# Patient Record
Sex: MALE | Race: WHITE | NOT HISPANIC OR LATINO | URBAN - METROPOLITAN AREA
[De-identification: names, ages, dates, MRNs, and addresses within clinical notes are randomized per-mention and may not be internally consistent; named-entity substitution may affect disease eponyms.]

---

## 2020-01-31 ENCOUNTER — EMERGENCY (EMERGENCY)
Facility: HOSPITAL | Age: 30
LOS: 1 days | Discharge: HOME | End: 2020-01-31
Attending: EMERGENCY MEDICINE | Admitting: EMERGENCY MEDICINE
Payer: COMMERCIAL

## 2020-01-31 VITALS
HEART RATE: 106 BPM | WEIGHT: 220.02 LBS | DIASTOLIC BLOOD PRESSURE: 70 MMHG | SYSTOLIC BLOOD PRESSURE: 119 MMHG | OXYGEN SATURATION: 95 % | RESPIRATION RATE: 18 BRPM | HEIGHT: 63 IN | TEMPERATURE: 98 F

## 2020-01-31 VITALS
TEMPERATURE: 99 F | OXYGEN SATURATION: 97 % | HEART RATE: 93 BPM | RESPIRATION RATE: 18 BRPM | DIASTOLIC BLOOD PRESSURE: 71 MMHG | SYSTOLIC BLOOD PRESSURE: 119 MMHG

## 2020-01-31 DIAGNOSIS — R07.89 OTHER CHEST PAIN: ICD-10-CM

## 2020-01-31 LAB
ANION GAP SERPL CALC-SCNC: 10 MMOL/L — SIGNIFICANT CHANGE UP (ref 5–17)
BASOPHILS # BLD AUTO: 0.05 K/UL — SIGNIFICANT CHANGE UP (ref 0–0.2)
BASOPHILS NFR BLD AUTO: 0.7 % — SIGNIFICANT CHANGE UP (ref 0–2)
BUN SERPL-MCNC: 12 MG/DL — SIGNIFICANT CHANGE UP (ref 7–23)
CALCIUM SERPL-MCNC: 9.1 MG/DL — SIGNIFICANT CHANGE UP (ref 8.4–10.5)
CHLORIDE SERPL-SCNC: 99 MMOL/L — SIGNIFICANT CHANGE UP (ref 96–108)
CO2 SERPL-SCNC: 29 MMOL/L — SIGNIFICANT CHANGE UP (ref 22–31)
CREAT SERPL-MCNC: 0.85 MG/DL — SIGNIFICANT CHANGE UP (ref 0.5–1.3)
EOSINOPHIL # BLD AUTO: 0.31 K/UL — SIGNIFICANT CHANGE UP (ref 0–0.5)
EOSINOPHIL NFR BLD AUTO: 4.2 % — SIGNIFICANT CHANGE UP (ref 0–6)
GLUCOSE SERPL-MCNC: 88 MG/DL — SIGNIFICANT CHANGE UP (ref 70–99)
HCT VFR BLD CALC: 42.9 % — SIGNIFICANT CHANGE UP (ref 39–50)
HGB BLD-MCNC: 13.8 G/DL — SIGNIFICANT CHANGE UP (ref 13–17)
IMM GRANULOCYTES NFR BLD AUTO: 0.5 % — SIGNIFICANT CHANGE UP (ref 0–1.5)
LYMPHOCYTES # BLD AUTO: 1.56 K/UL — SIGNIFICANT CHANGE UP (ref 1–3.3)
LYMPHOCYTES # BLD AUTO: 21.2 % — SIGNIFICANT CHANGE UP (ref 13–44)
MAGNESIUM SERPL-MCNC: 2.3 MG/DL — SIGNIFICANT CHANGE UP (ref 1.6–2.6)
MCHC RBC-ENTMCNC: 29.5 PG — SIGNIFICANT CHANGE UP (ref 27–34)
MCHC RBC-ENTMCNC: 32.2 GM/DL — SIGNIFICANT CHANGE UP (ref 32–36)
MCV RBC AUTO: 91.7 FL — SIGNIFICANT CHANGE UP (ref 80–100)
MONOCYTES # BLD AUTO: 1.02 K/UL — HIGH (ref 0–0.9)
MONOCYTES NFR BLD AUTO: 13.8 % — SIGNIFICANT CHANGE UP (ref 2–14)
NEUTROPHILS # BLD AUTO: 4.39 K/UL — SIGNIFICANT CHANGE UP (ref 1.8–7.4)
NEUTROPHILS NFR BLD AUTO: 59.6 % — SIGNIFICANT CHANGE UP (ref 43–77)
NRBC # BLD: 0 /100 WBCS — SIGNIFICANT CHANGE UP (ref 0–0)
PLATELET # BLD AUTO: 346 K/UL — SIGNIFICANT CHANGE UP (ref 150–400)
POTASSIUM SERPL-MCNC: 4.4 MMOL/L — SIGNIFICANT CHANGE UP (ref 3.5–5.3)
POTASSIUM SERPL-SCNC: 4.4 MMOL/L — SIGNIFICANT CHANGE UP (ref 3.5–5.3)
RBC # BLD: 4.68 M/UL — SIGNIFICANT CHANGE UP (ref 4.2–5.8)
RBC # FLD: 13.8 % — SIGNIFICANT CHANGE UP (ref 10.3–14.5)
SODIUM SERPL-SCNC: 138 MMOL/L — SIGNIFICANT CHANGE UP (ref 135–145)
WBC # BLD: 7.37 K/UL — SIGNIFICANT CHANGE UP (ref 3.8–10.5)
WBC # FLD AUTO: 7.37 K/UL — SIGNIFICANT CHANGE UP (ref 3.8–10.5)

## 2020-01-31 PROCEDURE — 83735 ASSAY OF MAGNESIUM: CPT

## 2020-01-31 PROCEDURE — 85025 COMPLETE CBC W/AUTO DIFF WBC: CPT

## 2020-01-31 PROCEDURE — 36415 COLL VENOUS BLD VENIPUNCTURE: CPT

## 2020-01-31 PROCEDURE — 99284 EMERGENCY DEPT VISIT MOD MDM: CPT

## 2020-01-31 PROCEDURE — 93005 ELECTROCARDIOGRAM TRACING: CPT

## 2020-01-31 PROCEDURE — 96374 THER/PROPH/DIAG INJ IV PUSH: CPT

## 2020-01-31 PROCEDURE — 71046 X-RAY EXAM CHEST 2 VIEWS: CPT

## 2020-01-31 PROCEDURE — 71046 X-RAY EXAM CHEST 2 VIEWS: CPT | Mod: 26

## 2020-01-31 PROCEDURE — 93010 ELECTROCARDIOGRAM REPORT: CPT | Mod: NC

## 2020-01-31 PROCEDURE — 80048 BASIC METABOLIC PNL TOTAL CA: CPT

## 2020-01-31 PROCEDURE — 84484 ASSAY OF TROPONIN QUANT: CPT

## 2020-01-31 PROCEDURE — 99284 EMERGENCY DEPT VISIT MOD MDM: CPT | Mod: 25

## 2020-01-31 RX ORDER — KETOROLAC TROMETHAMINE 30 MG/ML
15 SYRINGE (ML) INJECTION ONCE
Refills: 0 | Status: DISCONTINUED | OUTPATIENT
Start: 2020-01-31 | End: 2020-01-31

## 2020-01-31 RX ADMIN — Medication 15 MILLIGRAM(S): at 15:51

## 2020-01-31 RX ADMIN — Medication 15 MILLIGRAM(S): at 14:38

## 2020-01-31 NOTE — ED PROVIDER NOTE - CLINICAL SUMMARY MEDICAL DECISION MAKING FREE TEXT BOX
Patient in ED w concern for back/chest wall pain intermittently for many months.  No prior eval for symptoms.  CXR, labs and EKG completed.  EKG with non specific ST changes.  Labs and CXR unremarkable.  Patient is feeling well in ED and aware of all results.  He is encouraged to follow up with a primary care physician as well as cardiologist in 1-2 days for re eval.  Patient is instructed to take tylenol/motrin OTC as needed for pain and to return to ED immediately should his symptoms worsen or if he has any concern prior to this recommended follow up.  Patient is aware of plan and verbalizes his understanding.

## 2020-01-31 NOTE — ED ADULT NURSE NOTE - NSFALLRSKASSESSDT_ED_ALL_ED
Panel Management Review      Patient has the following on her problem list: None      Composite cancer screening  Chart review shows that this patient is due/due soon for the following Mammogram  Summary:    Patient is due/failing the following:   MAMMOGRAM    Action needed:   None. Mammogram completed.    Type of outreach:    Patient informed clinic that mammogram was completed.    Questions for provider review:    None                                                                                                                                    Lynne Apodaca CMA       Chart routed to N/A .           31-Jan-2020 14:19

## 2020-01-31 NOTE — ED PROVIDER NOTE - DIAGNOSTIC INTERPRETATION
Attending: Marie Mcduffie   CXR wet read: The heart appears to be within normal limits in transverse diameter.  No acute infiltrates, effusions or evidence of pulmonary vascular congestion.  The chest wall and surrounding bony structures appear normal.

## 2020-01-31 NOTE — ED PROVIDER NOTE - NSFOLLOWUPCLINICS_GEN_ALL_ED_FT
[facility]  Family Medicine  [facility]. [address]  [place], [place] [facility]  Phone: [phone]  Fax:   Follow Up Time:

## 2020-01-31 NOTE — ED PROVIDER NOTE - MUSCULOSKELETAL, MLM
No reproducible chest wall pain.  Spine appears normal, range of motion is not limited, no muscle or joint tenderness

## 2020-01-31 NOTE — ED PROVIDER NOTE - CARE PROVIDER_API CALL
Sindi Darling)  Cardiovascular Disease; Internal Medicine  [address] [place] [address]  Phone: [phone]  Fax: [phone]  Follow Up Time:

## 2020-01-31 NOTE — ED PROVIDER NOTE - NSFOLLOWUPINSTRUCTIONS_ED_ALL_ED_FT
Please follow up with your primary physician in 1-2 days for re evaluation.  Please return to ER immediately should your symptoms worsen or if you have any concern prior to this recommended follow up.    Chest Wall Pain    WHAT YOU NEED TO KNOW:    Chest wall pain may be caused by problems with the muscles, cartilage, or bones of the chest wall. Chest wall pain may also be caused by pain that spreads to your chest from another part of your body. The pain may be aching, severe, dull, or sharp. It may come and go, or it may be constant. The pain may be worse when you move in certain ways, breathe deeply, or cough.     DISCHARGE INSTRUCTIONS:    Call 911 if:     You have any of the following signs of a heart attack:   Squeezing, pressure, or pain in your chest      You may also have any of the following:   Discomfort or pain in your back, neck, jaw, stomach, or arm      Shortness of breath      Nausea or vomiting      Lightheadedness or a sudden cold sweat        Return to the emergency department if:     You have severe pain.        Contact your healthcare provider if:     You develop a rash.       You have other new symptoms.      Your pain does not improve, even with treatment.      You have questions or concerns about your condition or care.     Medicines: You may need any of the following:     NSAIDs, such as ibuprofen, help decrease swelling, pain, and fever. This medicine is available with or without a doctor's order. NSAIDs can cause stomach bleeding or kidney problems in certain people. If you take blood thinner medicine, always ask your healthcare provider if NSAIDs are safe for you. Always read the medicine label and follow directions.      Acetaminophen decreases pain. It is available without a doctor's order. Ask how much to take and how often to take it. Follow directions. Acetaminophen can cause liver damage if not taken correctly.      A cream may be applied to your chest to decrease pain.       Take your medicine as directed. Contact your healthcare provider if you think your medicine is not helping or if you have side effects. Tell him of her if you are allergic to any medicine. Keep a list of the medicines, vitamins, and herbs you take. Include the amounts, and when and why you take them. Bring the list or the pill bottles to follow-up visits. Carry your medicine list with you in case of an emergency.    Follow up with your healthcare provider as directed: Write down your questions so you remember to ask them during your visits.     Self-care:     Rest as needed. Avoid activities that make your chest wall pain worse.      Apply heat on your chest for 20 to 30 minutes every 2 hours for as many days as directed. Heat helps decrease pain and muscle spasms.      Apply ice on your chest for 15 to 20 minutes every hour or as directed. Use an ice pack, or put crushed ice in a plastic bag. Cover it with a towel. Ice helps prevent tissue damage and decreases swelling and pain.         © Copyright [employer] 2020       back to top                      © Copyright [employer] 2020

## 2020-01-31 NOTE — ED PROVIDER NOTE - PATIENT PORTAL LINK FT
You can access the FollowMyHealth Patient Portal offered by [facility] by registering at the following website: [email or web address]. By joining [facility]’s FollowMyHealth portal, you will also be able to view your health information using other applications (apps) compatible with our system.

## 2020-01-31 NOTE — ED ADULT NURSE NOTE - OBJECTIVE STATEMENT
pt reports bilateral lower chest pain, onset between 1200 and 1230 today.  pt states he had 3 episodes of this pain today during that 30 min time frame.  he describes the pain as a "rohit horse".  pain was worse with movement.  pt denies SOB.   pt states he has had similar pain over the past few years, but it is usually not this painful.   pt denies pain right now.   pt denies injury.  pt reports having "flu" a few weeks ago.   pt denies fevers now.  pt reports residual cough since "flu" with clear sputum.

## 2020-01-31 NOTE — ED PROVIDER NOTE - MUSCULOSKELETAL NEGATIVE STATEMENT, MLM
no reproducible chest wall pain.  no back pain, no gout, no musculoskeletal pain, no neck pain, and no weakness.

## 2020-01-31 NOTE — ED PROVIDER NOTE - OBJECTIVE STATEMENT
30 year old male with no known medical history presents to ED with concern for intermittent cramping/spasms to back over the past many months.  Patient states he has not previously been evaluated for these symptoms, as he has had insurance issues.  He denies associated fever, chills, anterior chest wall pain, abdominal pain, shortness of breath, nausea, emesis, changes to bowel movements or any additional acute complaints or concerns at this time.  He has not taken any medication for his symptoms prior to arrival in ED today.

## 2020-03-04 PROBLEM — Z00.00 ENCOUNTER FOR PREVENTIVE HEALTH EXAMINATION: Status: ACTIVE | Noted: 2020-03-04

## 2022-09-04 NOTE — ED ADULT TRIAGE NOTE - NS ED TRIAGE AVPU SCALE
seen earlier today     Chief Complaint: Type 2 Diabetes Mellitus     INTERVAL HX: son at bedside , noted BG above goal at dinner without pattern, son reports he gave pt nepro prior to dinner BG check yesterday which makes dinner BG post prandial . per pt and son po intake has improved greatly & breakfast is his biggest meal  . pt receiving first dose of admelog 10 w/ breakfast today f/u lunch bg       Review of Systems:  General: As above  Cardiovascular: No chest pain  Respiratory: No SOB  GI: No nausea, vomiting  Endocrine: no  S&Sx of hypoglycemia    Allergies    No Known Allergies    Intolerances      MEDICATIONS  (STANDING):  amLODIPine   Tablet 10 milliGRAM(s) Oral daily  artificial  tears Solution 2 Drop(s) Left EYE every 6 hours  brivaracetam 50 milliGRAM(s) Oral two times a day  carvedilol 12.5 milliGRAM(s) Oral every 12 hours  chlorhexidine 2% Cloths 1 Application(s) Topical <User Schedule>  chlorhexidine 4% Liquid 1 Application(s) Topical <User Schedule>  doxazosin 4 milliGRAM(s) Oral <User Schedule>  epoetin cortney-epbx (RETACRIT) Injectable 85987 Unit(s) SubCutaneous <User Schedule>  finasteride 5 milliGRAM(s) Oral daily  furosemide   Injectable 80 milliGRAM(s) IV Push once  heparin   Injectable 5000 Unit(s) SubCutaneous every 12 hours  hydrALAZINE 100 milliGRAM(s) Oral three times a day  insulin glargine Injectable (LANTUS) 5 Unit(s) SubCutaneous at bedtime  insulin lispro (ADMELOG) corrective regimen sliding scale   SubCutaneous three times a day before meals  insulin lispro (ADMELOG) corrective regimen sliding scale   SubCutaneous at bedtime  insulin lispro Injectable (ADMELOG) 10 Unit(s) SubCutaneous before breakfast  insulin lispro Injectable (ADMELOG) 8 Unit(s) SubCutaneous before lunch  insulin lispro Injectable (ADMELOG) 8 Unit(s) SubCutaneous before dinner  levothyroxine 50 MICROGram(s) Oral daily  melatonin 6 milliGRAM(s) Oral at bedtime  multivitamin 1 Tablet(s) Oral daily  mycophenolate mofetil 250 milliGRAM(s) Oral two times a day  nystatin    Suspension 032680 Unit(s) Oral four times a day  pantoprazole    Tablet 40 milliGRAM(s) Oral <User Schedule>  phenytoin   Capsule 200 milliGRAM(s) Oral two times a day  polyethylene glycol 3350 17 Gram(s) Oral daily  predniSONE   Tablet 10 milliGRAM(s) Oral daily  senna 2 Tablet(s) Oral at bedtime  sirolimus 7 milliGRAM(s) Oral <User Schedule>  trimethoprim   80 mG/sulfamethoxazole 400 mG 1 Tablet(s) Oral daily  valGANciclovir 450 milliGRAM(s) Oral <User Schedule>  warfarin 10 milliGRAM(s) Oral once        finasteride   5 milliGRAM(s) Oral (09-03-22 @ 12:01)    insulin glargine Injectable (LANTUS)   5 Unit(s) SubCutaneous (09-03-22 @ 21:54)    insulin lispro (ADMELOG) corrective regimen sliding scale   2 Unit(s) SubCutaneous (09-03-22 @ 17:34)   1 Unit(s) SubCutaneous (09-03-22 @ 13:06)    insulin lispro Injectable (ADMELOG)   10 Unit(s) SubCutaneous (09-04-22 @ 08:56)    insulin lispro Injectable (ADMELOG)   8 Unit(s) SubCutaneous (09-03-22 @ 13:05)    insulin lispro Injectable (ADMELOG)   8 Unit(s) SubCutaneous (09-03-22 @ 17:36)    levothyroxine   50 MICROGram(s) Oral (09-04-22 @ 05:24)    predniSONE   Tablet   10 milliGRAM(s) Oral (09-04-22 @ 05:26)        PHYSICAL EXAM:  VITALS: T(C): 36.8 (09-04-22 @ 09:00)  T(F): 98.2 (09-04-22 @ 09:00), Max: 98.6 (09-04-22 @ 05:00)  HR: 80 (09-04-22 @ 09:00) (75 - 83)  BP: 152/78 (09-04-22 @ 09:00) (152/67 - 184/75)  RR:  (18 - 20)  SpO2:  (95% - 97%)  Wt(kg): --  GENERAL: male sitting in chair  in NAD  Respiratory: Respirations unlabored   Extremities: Warm, no edema  NEURO: Alert , appropriate       LABS:    POCT Blood Glucose.: 125 mg/dL (09-04-22 @ 08:29)  POCT Blood Glucose.: 123 mg/dL (09-03-22 @ 21:27)  POCT Blood Glucose.: 246 mg/dL (09-03-22 @ 17:25)  POCT Blood Glucose.: 177 mg/dL (09-03-22 @ 12:13)  POCT Blood Glucose.: 128 mg/dL (09-03-22 @ 08:06)  POCT Blood Glucose.: 101 mg/dL (09-02-22 @ 21:45)  POCT Blood Glucose.: 125 mg/dL (09-02-22 @ 17:34)  POCT Blood Glucose.: 268 mg/dL (09-02-22 @ 12:19)  POCT Blood Glucose.: 169 mg/dL (09-02-22 @ 08:10)  POCT Blood Glucose.: 130 mg/dL (09-01-22 @ 21:42)  POCT Blood Glucose.: 261 mg/dL (09-01-22 @ 17:16)  POCT Blood Glucose.: 276 mg/dL (09-01-22 @ 12:31)                            8.5    5.77  )-----------( 468      ( 04 Sep 2022 06:05 )             26.7       09-04    129<L>  |  90<L>  |  76<H>  ----------------------------<  102<H>  4.9   |  22  |  4.70<H>    Ca    8.9      04 Sep 2022 06:07  Phos  4.8     09-04  Mg     2.2     09-04    TPro  6.8  /  Alb  2.9<L>  /  TBili  0.3  /  DBili  0.2  /  AST  20  /  ALT  22  /  AlkPhos  434<H>  09-04      eGFR: 13 mL/min/1.73m2 (04 Sep 2022 06:07)      07-27 Chol -- Direct LDL -- LDL calculated -- HDL -- Trig 118    Thyroid Function Tests:          A1C with Estimated Average Glucose Result: 6.4 % (09-04-22 @ 06:08)  A1C with Estimated Average Glucose Result: 6.0 % (06-30-22 @ 05:49)      Estimated Average Glucose: 137 mg/dL (09-04-22 @ 06:08)  Estimated Average Glucose: 126 mg/dL (06-30-22 @ 05:49)                         Alert-The patient is alert, awake and responds to voice. The patient is oriented to time, place, and person. The triage nurse is able to obtain subjective information.

## 2023-01-01 NOTE — ED PROVIDER NOTE - ENMT NEGATIVE STATEMENT, MLM
Ears: no ear pain and no hearing problems.Nose: no nasal congestion and no nasal drainage.Mouth/Throat: no dysphagia, no hoarseness and no throat pain.Neck: no lumps, no pain, no stiffness and no swollen glands. 37.1